# Patient Record
Sex: MALE | Race: WHITE | Employment: FULL TIME | URBAN - METROPOLITAN AREA
[De-identification: names, ages, dates, MRNs, and addresses within clinical notes are randomized per-mention and may not be internally consistent; named-entity substitution may affect disease eponyms.]

---

## 2018-07-19 ENCOUNTER — OFFICE VISIT (OUTPATIENT)
Dept: FAMILY MEDICINE CLINIC | Age: 53
End: 2018-07-19

## 2018-07-19 VITALS
SYSTOLIC BLOOD PRESSURE: 164 MMHG | HEIGHT: 76 IN | BODY MASS INDEX: 30.93 KG/M2 | OXYGEN SATURATION: 96 % | RESPIRATION RATE: 16 BRPM | DIASTOLIC BLOOD PRESSURE: 100 MMHG | WEIGHT: 254 LBS | HEART RATE: 66 BPM

## 2018-07-19 DIAGNOSIS — F42.9 OBSESSIVE-COMPULSIVE DISORDER, UNSPECIFIED TYPE: ICD-10-CM

## 2018-07-19 DIAGNOSIS — Z00.00 ANNUAL PHYSICAL EXAM: Primary | ICD-10-CM

## 2018-07-19 DIAGNOSIS — L29.9 ITCHY SCALP: ICD-10-CM

## 2018-07-19 DIAGNOSIS — F41.9 ANXIETY: Primary | ICD-10-CM

## 2018-07-19 PROCEDURE — 99203 OFFICE O/P NEW LOW 30 MIN: CPT | Performed by: FAMILY MEDICINE

## 2018-07-19 ASSESSMENT — ENCOUNTER SYMPTOMS: ROS SKIN COMMENTS: ITCHY SCALP

## 2018-07-19 ASSESSMENT — PATIENT HEALTH QUESTIONNAIRE - PHQ9
1. LITTLE INTEREST OR PLEASURE IN DOING THINGS: 1
2. FEELING DOWN, DEPRESSED OR HOPELESS: 1
SUM OF ALL RESPONSES TO PHQ QUESTIONS 1-9: 2
SUM OF ALL RESPONSES TO PHQ9 QUESTIONS 1 & 2: 2

## 2018-07-19 NOTE — PROGRESS NOTES
2018    This is a 46 y.o. male   Chief Complaint   Patient presents with    Established New Doctor     Anxiety possible OCD habits starting Sister has noticed since brother came to live with her.  Hair/Scalp Problem     gets itchy and has sores    . HPI  Pt presents today as a new pt to establish a PCP. Patient has a history of anxiety with possible obsessive-compulsive disorder. Patient has recently moved down from [facility] where he was living with his brother but his brother is now . Patient is living with his sister who accompanies him to the appointment today. States today that his anxiety has been worse since his brother's death, but his sister states that there have been issues with anxiety for several years. She also states that it is difficult to be in crowds. Also admits to obsessive thoughts and compulsive behavior. Patient also admits to recent itchy scalp. Has successfully used an over-the-counter shampoo for this condition but cannot recall the name of the shampoo. Past Medical History:   Diagnosis Date    Anxiety     GERD (gastroesophageal reflux disease)        Past Surgical History:   Procedure Laterality Date    MOUTH SURGERY         Social History     Social History    Marital status: Single     Spouse name: N/A    Number of children: N/A    Years of education: N/A     Occupational History    Not on file. Social History Main Topics    Smoking status: Never Smoker    Smokeless tobacco: Never Used    Alcohol use 3.6 oz/week     6 Cans of beer per week      Comment: Weekly    Drug use: Yes     Types: Marijuana      Comment: in past does not smoke     Sexual activity: No     Other Topics Concern    Not on file     Social History Narrative    No narrative on file       Family History   Problem Relation Age of Onset    Other Mother     Other Father        No current outpatient prescriptions on file.      No current facility-administered medications

## 2018-08-02 DIAGNOSIS — Z00.00 ANNUAL PHYSICAL EXAM: ICD-10-CM

## 2018-08-02 LAB
A/G RATIO: 1.6 (ref 1.1–2.2)
ALBUMIN SERPL-MCNC: 4.6 G/DL (ref 3.4–5)
ALP BLD-CCNC: 123 U/L (ref 40–129)
ALT SERPL-CCNC: 17 U/L (ref 10–40)
ANION GAP SERPL CALCULATED.3IONS-SCNC: 13 MMOL/L (ref 3–16)
AST SERPL-CCNC: 18 U/L (ref 15–37)
BASOPHILS ABSOLUTE: 0.1 K/UL (ref 0–0.2)
BASOPHILS RELATIVE PERCENT: 0.9 %
BILIRUB SERPL-MCNC: 0.9 MG/DL (ref 0–1)
BUN BLDV-MCNC: 18 MG/DL (ref 7–20)
CALCIUM SERPL-MCNC: 10.3 MG/DL (ref 8.3–10.6)
CHLORIDE BLD-SCNC: 103 MMOL/L (ref 99–110)
CHOLESTEROL, TOTAL: 186 MG/DL (ref 0–199)
CO2: 24 MMOL/L (ref 21–32)
CREAT SERPL-MCNC: 1.2 MG/DL (ref 0.9–1.3)
EOSINOPHILS ABSOLUTE: 0.1 K/UL (ref 0–0.6)
EOSINOPHILS RELATIVE PERCENT: 2 %
GFR AFRICAN AMERICAN: >60
GFR NON-AFRICAN AMERICAN: >60
GLOBULIN: 2.8 G/DL
GLUCOSE BLD-MCNC: 92 MG/DL (ref 70–99)
HCT VFR BLD CALC: 42.7 % (ref 40.5–52.5)
HDLC SERPL-MCNC: 64 MG/DL (ref 40–60)
HEMOGLOBIN: 14.3 G/DL (ref 13.5–17.5)
LDL CHOLESTEROL CALCULATED: 103 MG/DL
LYMPHOCYTES ABSOLUTE: 1.5 K/UL (ref 1–5.1)
LYMPHOCYTES RELATIVE PERCENT: 23.9 %
MAGNESIUM: 2.3 MG/DL (ref 1.8–2.4)
MCH RBC QN AUTO: 31.1 PG (ref 26–34)
MCHC RBC AUTO-ENTMCNC: 33.5 G/DL (ref 31–36)
MCV RBC AUTO: 92.9 FL (ref 80–100)
MONOCYTES ABSOLUTE: 0.6 K/UL (ref 0–1.3)
MONOCYTES RELATIVE PERCENT: 9.2 %
NEUTROPHILS ABSOLUTE: 4 K/UL (ref 1.7–7.7)
NEUTROPHILS RELATIVE PERCENT: 64 %
PDW BLD-RTO: 14.2 % (ref 12.4–15.4)
PLATELET # BLD: 183 K/UL (ref 135–450)
PMV BLD AUTO: 9.9 FL (ref 5–10.5)
POTASSIUM SERPL-SCNC: 4.2 MMOL/L (ref 3.5–5.1)
PROSTATE SPECIFIC ANTIGEN: 0.79 NG/ML (ref 0–4)
RBC # BLD: 4.6 M/UL (ref 4.2–5.9)
SODIUM BLD-SCNC: 140 MMOL/L (ref 136–145)
T3 FREE: 3.6 PG/ML (ref 2.3–4.2)
T4 FREE: 1.3 NG/DL (ref 0.9–1.8)
TOTAL PROTEIN: 7.4 G/DL (ref 6.4–8.2)
TRIGL SERPL-MCNC: 97 MG/DL (ref 0–150)
TSH SERPL DL<=0.05 MIU/L-ACNC: 2.23 UIU/ML (ref 0.27–4.2)
VITAMIN D 25-HYDROXY: 27.5 NG/ML
VLDLC SERPL CALC-MCNC: 19 MG/DL
WBC # BLD: 6.3 K/UL (ref 4–11)

## 2018-08-25 DIAGNOSIS — E55.9 VITAMIN D DEFICIENCY: Primary | ICD-10-CM

## 2018-08-25 RX ORDER — ERGOCALCIFEROL (VITAMIN D2) 1250 MCG
50000 CAPSULE ORAL WEEKLY
Qty: 4 CAPSULE | Refills: 1 | Status: CANCELLED | OUTPATIENT
Start: 2018-08-25 | End: 2018-10-14

## 2018-08-31 ENCOUNTER — TELEPHONE (OUTPATIENT)
Dept: FAMILY MEDICINE CLINIC | Age: 53
End: 2018-08-31

## 2018-08-31 NOTE — TELEPHONE ENCOUNTER
Notes recorded by Eduardo Mcdaniel MA on 8/31/2018 at 10:50 AM EDT  Patient advised will call us back with a pharmacy that he uses with address and phone/fax numbers          Pt called back and will be having his sister, Anibal Olivarez, call us back because she works at [employer] where he wants the vitamin D sent.

## 2018-09-06 ENCOUNTER — TELEPHONE (OUTPATIENT)
Dept: FAMILY MEDICINE CLINIC | Age: 53
End: 2018-09-06

## 2018-09-06 DIAGNOSIS — E55.9 VITAMIN D DEFICIENCY: Primary | ICD-10-CM

## 2018-09-06 RX ORDER — ERGOCALCIFEROL (VITAMIN D2) 1250 MCG
50000 CAPSULE ORAL WEEKLY
Qty: 4 CAPSULE | Refills: 1 | Status: SHIPPED | OUTPATIENT
Start: 2018-09-06 | End: 2022-03-10

## 2018-09-06 NOTE — TELEPHONE ENCOUNTER
Patients sister is wanting to know if dr. Ronan Lanier was sending in vitamin D medication because as of 9/6/2018 nothing was called in

## 2018-10-02 ENCOUNTER — TELEPHONE (OUTPATIENT)
Dept: FAMILY MEDICINE CLINIC | Age: 53
End: 2018-10-02

## 2018-10-18 ENCOUNTER — OFFICE VISIT (OUTPATIENT)
Dept: FAMILY MEDICINE CLINIC | Age: 53
End: 2018-10-18

## 2018-10-18 VITALS
SYSTOLIC BLOOD PRESSURE: 158 MMHG | WEIGHT: 268 LBS | BODY MASS INDEX: 32.63 KG/M2 | DIASTOLIC BLOOD PRESSURE: 94 MMHG | HEART RATE: 62 BPM | RESPIRATION RATE: 16 BRPM | HEIGHT: 76 IN | OXYGEN SATURATION: 96 %

## 2018-10-18 DIAGNOSIS — R03.0 ELEVATED BLOOD PRESSURE READING: Primary | ICD-10-CM

## 2018-10-18 DIAGNOSIS — L29.9 ITCHY SCALP: ICD-10-CM

## 2018-10-18 PROCEDURE — 99213 OFFICE O/P EST LOW 20 MIN: CPT | Performed by: FAMILY MEDICINE

## 2018-10-18 RX ORDER — BLOOD PRESSURE TEST KIT
1 KIT MISCELLANEOUS 2 TIMES DAILY
Qty: 1 KIT | Refills: 0 | Status: SHIPPED | OUTPATIENT
Start: 2018-10-18 | End: 2022-03-10

## 2018-10-18 NOTE — PROGRESS NOTES
 T4 Free 08/02/2018 1.3  0.9 - 1.8 ng/dL Final    T3, Free 08/02/2018 3.6  2.3 - 4.2 pg/mL Final    Cholesterol, Total 08/02/2018 186  0 - 199 mg/dL Final    Triglycerides 08/02/2018 97  0 - 150 mg/dL Final    HDL 08/02/2018 64* 40 - 60 mg/dL Final    LDL Calculated 08/02/2018 103* <100 mg/dL Final    VLDL Cholesterol Calculated 08/02/2018 19  Not Established mg/dL Final    WBC 08/02/2018 6.3  4.0 - 11.0 K/uL Final    RBC 08/02/2018 4.60  4.20 - 5.90 M/uL Final    Hemoglobin 08/02/2018 14.3  13.5 - 17.5 g/dL Final    Hematocrit 08/02/2018 42.7  40.5 - 52.5 % Final    MCV 08/02/2018 92.9  80.0 - 100.0 fL Final    MCH 08/02/2018 31.1  26.0 - 34.0 pg Final    MCHC 08/02/2018 33.5  31.0 - 36.0 g/dL Final    RDW 08/02/2018 14.2  12.4 - 15.4 % Final    Platelets 97/75/6244 183  135 - 450 K/uL Final    MPV 08/02/2018 9.9  5.0 - 10.5 fL Final    Neutrophils % 08/02/2018 64.0  % Final    Lymphocytes % 08/02/2018 23.9  % Final    Monocytes % 08/02/2018 9.2  % Final    Eosinophils % 08/02/2018 2.0  % Final    Basophils % 08/02/2018 0.9  % Final    Neutrophils # 08/02/2018 4.0  1.7 - 7.7 K/uL Final    Lymphocytes # 08/02/2018 1.5  1.0 - 5.1 K/uL Final    Monocytes # 08/02/2018 0.6  0.0 - 1.3 K/uL Final    Eosinophils # 08/02/2018 0.1  0.0 - 0.6 K/uL Final    Basophils # 08/02/2018 0.1  0.0 - 0.2 K/uL Final    Sodium 08/02/2018 140  136 - 145 mmol/L Final    Potassium 08/02/2018 4.2  3.5 - 5.1 mmol/L Final    Chloride 08/02/2018 103  99 - 110 mmol/L Final    CO2 08/02/2018 24  21 - 32 mmol/L Final    Anion Gap 08/02/2018 13  3 - 16 Final    Glucose 08/02/2018 92  70 - 99 mg/dL Final    BUN 08/02/2018 18  7 - 20 mg/dL Final    CREATININE 08/02/2018 1.2  0.9 - 1.3 mg/dL Final    GFR Non- 08/02/2018 >60  >60 Final    Comment: >60 mL/min/1.73m2 EGFR, calc. for ages 25 and older using the  MDRD formula (not corrected for weight), is valid for stable  renal function.       GFR

## 2022-03-10 ENCOUNTER — HOSPITAL ENCOUNTER (EMERGENCY)
Age: 57
Discharge: HOME | End: 2022-03-10

## 2022-03-10 VITALS
TEMPERATURE: 99.1 F | DIASTOLIC BLOOD PRESSURE: 86 MMHG | WEIGHT: 230 LBS | BODY MASS INDEX: 28.01 KG/M2 | RESPIRATION RATE: 16 BRPM | OXYGEN SATURATION: 97 % | HEART RATE: 69 BPM | HEIGHT: 76 IN | SYSTOLIC BLOOD PRESSURE: 149 MMHG

## 2022-03-10 DIAGNOSIS — K04.7 DENTAL ABSCESS: ICD-10-CM

## 2022-03-10 DIAGNOSIS — K08.89 PAIN, DENTAL: Primary | ICD-10-CM

## 2022-03-10 DIAGNOSIS — R22.0 SWELLING ASSOCIATED WITH DENTAL STRUCTURE: ICD-10-CM

## 2022-03-10 PROCEDURE — 6370000000 HC RX 637 (ALT 250 FOR IP): Performed by: PHYSICIAN ASSISTANT

## 2022-03-10 PROCEDURE — 99284 EMERGENCY DEPT VISIT MOD MDM: CPT

## 2022-03-10 RX ORDER — CLINDAMYCIN HYDROCHLORIDE 150 MG/1
450 CAPSULE ORAL ONCE
Status: COMPLETED | OUTPATIENT
Start: 2022-03-10 | End: 2022-03-10

## 2022-03-10 RX ORDER — CLINDAMYCIN HYDROCHLORIDE 300 MG/1
300 CAPSULE ORAL 3 TIMES DAILY
Qty: 21 CAPSULE | Refills: 0 | Status: SHIPPED | OUTPATIENT
Start: 2022-03-10 | End: 2022-03-17

## 2022-03-10 RX ADMIN — CLINDAMYCIN HYDROCHLORIDE 450 MG: 150 CAPSULE ORAL at 11:12

## 2022-03-10 NOTE — ED PROVIDER NOTES
and lower normal.  · Mouth:  normal tongue and buccal mucosa. · Dental:  Swelling to left lower jaw, tooth 17, cavity with associated erythema and swelling to surrounding gumline. No elevation of the floor of the mouth, airway is patent. Trismus: No.         Drooling: No.           Airway stridor: No.  · Facial skin: bilateral no wounds, erythema, or swelling. · Respiratory:  Clear to auscultation and breath sounds equal.    · CV: Regular rate and rhythm, normal heart sounds, without pathological murmurs, ectopy, gallops, or rubs. · Skin:  No rashes, erythema or lesions present, unless noted elsewhere. .  · Lymphatics: No lymphangitis or adenopathy noted. · Neurological:  Oriented. Motor functions intact. Lab / Imaging Results   (All laboratory and radiology results have been personally reviewed by myself)  Labs:  No results found for this visit on 03/10/22. Imaging: All Radiology results interpreted by Radiologist unless otherwise noted. No orders to display     ED Course / Medical Decision Making     Medications   clindamycin (CLEOCIN) capsule 450 mg (has no administration in time range)        Plan of Care/Counseling:  Afebrile, non toxic, NAD. SpO2 on room air of 97% not hypoxic. Physician Assistant on duty reviewed today's visit with the patient in addition to providing specific details for the plan of care and counseling regarding the diagnosis and prognosis. Questions are answered at this time and are agreeable with the plan. I estimate there is LOW risk for a ANAPHYLAXIS, DEEP SPACE INFECTION (e.g., ABIMAELS ANGINA OR RETROPHARYNGEAL ABSCESS), EPIGLOTTITIS, MENINGITIS, or AIRWAY COMPROMISE, thus I consider the discharge disposition reasonable. Also, there is no evidence or peritonitis, sepsis, or toxicity. Isaak Davis and I have discussed the diagnosis and risks, and we agree with discharging home to follow-up with their primary doctor.  We also discussed returning to the Emergency Department immediately if new or worsening symptoms occur. We have discussed the symptoms which are most concerning (e.g., changing or worsening pain, trouble swallowing or breathing, neck stiffness or fever) that necessitate immediate return. Final Impression    1. Pain, dental    2. Swelling associated with dental structure    3. Dental abscess        Discharge Vital Signs:  Blood pressure (!) 149/86, pulse 69, temperature 99.1 °F (37.3 °C), temperature source Oral, resp. rate 16, height 6' 4\" (1.93 m), weight 230 lb (104.3 kg), SpO2 97 %. New Medications     New Prescriptions    CLINDAMYCIN (CLEOCIN) 300 MG CAPSULE    Take 1 capsule by mouth 3 times daily for 7 days     Electronically signed by Sunshine Boland PA-C   DD: 3/10/22  **This report was transcribed using voice recognition software. Every effort was made to ensure accuracy; however, inadvertent computerized transcription errors may be present.   END OF ED PROVIDER NOTE       Sunshine Boland PA-C  03/10/22 1100

## 2022-03-10 NOTE — ED TRIAGE NOTES
Patient presents with left sided tooth pain with swelling that started 2 days ago. Has dental appt at the end of march. Pain has decreased today but swelling has increased.

## 2022-03-16 ENCOUNTER — TELEMEDICINE (OUTPATIENT)
Dept: FAMILY MEDICINE CLINIC | Age: 57
End: 2022-03-16

## 2022-03-16 DIAGNOSIS — K04.7 DENTAL INFECTION: Primary | ICD-10-CM

## 2022-03-16 DIAGNOSIS — F42.9 OBSESSIVE-COMPULSIVE DISORDER, UNSPECIFIED TYPE: ICD-10-CM

## 2022-03-16 DIAGNOSIS — F41.9 ANXIETY: ICD-10-CM

## 2022-03-16 PROCEDURE — 99203 OFFICE O/P NEW LOW 30 MIN: CPT | Performed by: FAMILY MEDICINE

## 2022-03-16 RX ORDER — CLINDAMYCIN HYDROCHLORIDE 300 MG/1
300 CAPSULE ORAL 3 TIMES DAILY
Qty: 21 CAPSULE | Refills: 0 | Status: CANCELLED | OUTPATIENT
Start: 2022-03-16 | End: 2022-03-23

## 2022-03-16 SDOH — ECONOMIC STABILITY: TRANSPORTATION INSECURITY
IN THE PAST 12 MONTHS, HAS LACK OF TRANSPORTATION KEPT YOU FROM MEETINGS, WORK, OR FROM GETTING THINGS NEEDED FOR DAILY LIVING?: NO

## 2022-03-16 SDOH — ECONOMIC STABILITY: INCOME INSECURITY: IN THE LAST 12 MONTHS, WAS THERE A TIME WHEN YOU WERE NOT ABLE TO PAY THE MORTGAGE OR RENT ON TIME?: NO

## 2022-03-16 SDOH — ECONOMIC STABILITY: HOUSING INSECURITY
IN THE LAST 12 MONTHS, WAS THERE A TIME WHEN YOU DID NOT HAVE A STEADY PLACE TO SLEEP OR SLEPT IN A SHELTER (INCLUDING NOW)?: NO

## 2022-03-16 SDOH — ECONOMIC STABILITY: FOOD INSECURITY: WITHIN THE PAST 12 MONTHS, THE FOOD YOU BOUGHT JUST DIDN'T LAST AND YOU DIDN'T HAVE MONEY TO GET MORE.: NEVER TRUE

## 2022-03-16 SDOH — ECONOMIC STABILITY: FOOD INSECURITY: WITHIN THE PAST 12 MONTHS, YOU WORRIED THAT YOUR FOOD WOULD RUN OUT BEFORE YOU GOT MONEY TO BUY MORE.: NEVER TRUE

## 2022-03-16 SDOH — ECONOMIC STABILITY: TRANSPORTATION INSECURITY
IN THE PAST 12 MONTHS, HAS THE LACK OF TRANSPORTATION KEPT YOU FROM MEDICAL APPOINTMENTS OR FROM GETTING MEDICATIONS?: NO

## 2022-03-16 ASSESSMENT — SOCIAL DETERMINANTS OF HEALTH (SDOH): HOW HARD IS IT FOR YOU TO PAY FOR THE VERY BASICS LIKE FOOD, HOUSING, MEDICAL CARE, AND HEATING?: NOT HARD AT ALL

## 2022-03-16 ASSESSMENT — PATIENT HEALTH QUESTIONNAIRE - PHQ9
SUM OF ALL RESPONSES TO PHQ QUESTIONS 1-9: 0
2. FEELING DOWN, DEPRESSED OR HOPELESS: 0
SUM OF ALL RESPONSES TO PHQ QUESTIONS 1-9: 0
SUM OF ALL RESPONSES TO PHQ QUESTIONS 1-9: 0
1. LITTLE INTEREST OR PLEASURE IN DOING THINGS: 0
SUM OF ALL RESPONSES TO PHQ QUESTIONS 1-9: 0
SUM OF ALL RESPONSES TO PHQ9 QUESTIONS 1 & 2: 0
DEPRESSION UNABLE TO ASSESS: FUNCTIONAL CAPACITY MOTIVATION LIMITS ACCURACY

## 2022-03-16 NOTE — PROGRESS NOTES
3/16/2022    TELEHEALTH EVALUATION -- Audio/Visual (During OZXFX-65 public health emergency)    HPI:    Rodger Lopez (:  1965) has requested an audio/video evaluation for the following concern(s):    Pt presents today via doxy. me Video visit for lower left tooth pain for the last 2 weeks. Taking Cleocin 300 mg 3 times daily x7 days. [facility] emergency department on March 10, 2022. Blood pressure was elevated at 149/86 in the emergency department. Patient had not been seen by myself since 2018. Present today on the visit with his sister, pt states that Cleocin has helped, edema has decreased. [facility] dental appointment on  [facility] [place]. Denies chills, feels a little warm. States that his BP is usually \"indy high\", drinks coffee. Has been taking Ibuprofen. Review of Systems   Constitutional: Positive for fever. Negative for chills. HENT:        Positive for dental pain and facial edema that has improved. Prior to Visit Medications    Medication Sig Taking? Authorizing Provider   Magic Mouthwash (MIRACLE MOUTHWASH) Swish and spit 5 mLs 4 times daily as needed for Irritation Yes Shirley Vergara,    clindamycin (CLEOCIN) 300 MG capsule Take 1 capsule by mouth 3 times daily for 7 days Yes Leah Medina PA-C       Social History     Tobacco Use    Smoking status: Current Every Day Smoker     Packs/day: 1.00     Types: Cigarettes    Smokeless tobacco: Never Used   Vaping Use    Vaping Use: Never used   Substance Use Topics    Alcohol use:  Yes     Alcohol/week: 6.0 standard drinks     Types: 6 Cans of beer per week     Comment: Weekly    Drug use: Yes     Types: Marijuana (Weed)     Comment: in past does not smoke         Allergies   Allergen Reactions    Penicillins Rash   ,   Past Medical History:   Diagnosis Date    Anxiety     GERD (gastroesophageal reflux disease)    ,   Past Surgical History:   Procedure Laterality Date    MOUTH SURGERY  2001       PHYSICAL EXAMINATION:  [ INSTRUCTIONS:  \"[x]\" Indicates a positive item  \"[]\" Indicates a negative item  -- DELETE ALL ITEMS NOT EXAMINED]  Vital Signs: (As obtained by patient/caregiver or practitioner observation)    Height  - 6' 4\"            Weight -  235 lb            Blood pressure-        Heart rate-     Temperature-      Constitutional: [x] Appears well-developed and well-nourished [x] No apparent distress      [] Abnormal-   Mental status  [x] Alert and awake  [x] Oriented to person/place/time [x]Able to follow commands      Eyes:  EOM    [x]  Normal  [] Abnormal-  Sclera  []  Normal  [] Abnormal -         Discharge []  None visible  [] Abnormal -    HENT:   [x] Normocephalic, atraumatic. [x] Abnormal - mild left jaw line edema     Mouth/Throat: Mucous membranes are moist.     External Ears [x] Normal  [] Abnormal-     Neck: [x] No visualized mass     Pulmonary/Chest: [x] Respiratory effort normal.  [x] No visualized signs of difficulty breathing or respiratory distress        [] Abnormal-      Musculoskeletal:   [] Normal gait with no signs of ataxia         [x] Normal range of motion of neck        [] Abnormal-       Neurological:        [x] No Facial Asymmetry (Cranial nerve 7 motor function) (limited exam to video visit)          [x] No gaze palsy        [] Abnormal-         Skin:        [x] No significant exanthematous lesions or discoloration noted on facial skin         [] Abnormal-            Psychiatric:       [x] Normal Affect [] No Hallucinations        [] Abnormal-     Other pertinent observable physical exam findings-     ASSESSMENT/PLAN:  1. Dental infection  - improved sx  - continue Cleocin prescribed on 03/10  - Magic Mouthwash (MIRACLE MOUTHWASH); Swish and spit 5 mLs 4 times daily as needed for Irritation  Dispense: 1 each; Refill: 1    2. Anxiety  - TSH; Future  - Lipid Panel;  Future  - CBC with Auto Differential; Future  - Comprehensive Metabolic Panel; Future  - PSA Screening; Future    3. Obsessive-compulsive disorder, unspecified type  - TSH; Future  - Lipid Panel; Future  - CBC with Auto Differential; Future  - Comprehensive Metabolic Panel; Future  - PSA Screening; Future    Pt's sister will call office Friday with a status. BP BID x 2 weeks and call office with readings. Return in about 2 months (around 5/16/2022) for Physical Exam.    Yu Samaniego, was evaluated through a synchronous (real-time) audio-video encounter. The patient (or guardian if applicable) is aware that this is a billable service. Verbal consent to proceed has been obtained within the past 12 months. The visit was conducted pursuant to the emergency declaration under the [address] [place] [address], [address] [place] [address] authority and the [employer] General Act. Patient identification was verified, and a caregiver was present when appropriate. The patient was located in a state where the provider was credentialed to provide care. Total time spent on this encounter: Not billed by time    --Sotero Mascorro DO on 3/16/2022 at 1:59 PM    An electronic signature was used to authenticate this note.

## 2022-05-29 ENCOUNTER — TELEPHONE (OUTPATIENT)
Dept: FAMILY MEDICINE CLINIC | Age: 57
End: 2022-05-29

## 2022-05-29 NOTE — TELEPHONE ENCOUNTER
Pt's sibling is looking into Guardianship and SSI for pt. Can you please provide any information about starting the process for both. I do know that we would supply all medical documentation including progress notes, labs, imaging, etc. Thank you.

## 2022-06-19 RX ORDER — SELENIUM SULFIDE 22.5 MG/ML
5 SHAMPOO TOPICAL
Qty: 1 EACH | Refills: 0 | Status: SHIPPED | OUTPATIENT
Start: 2022-06-19

## 2022-12-01 ENCOUNTER — OFFICE VISIT (OUTPATIENT)
Dept: FAMILY MEDICINE CLINIC | Age: 57
End: 2022-12-01
Payer: MEDICAID

## 2022-12-01 VITALS
OXYGEN SATURATION: 99 % | WEIGHT: 232.4 LBS | BODY MASS INDEX: 28.29 KG/M2 | RESPIRATION RATE: 16 BRPM | SYSTOLIC BLOOD PRESSURE: 148 MMHG | DIASTOLIC BLOOD PRESSURE: 84 MMHG | HEART RATE: 65 BPM

## 2022-12-01 DIAGNOSIS — L30.9 DERMATITIS: ICD-10-CM

## 2022-12-01 DIAGNOSIS — I10 ELEVATED BLOOD PRESSURE READING IN OFFICE WITH DIAGNOSIS OF HYPERTENSION: Primary | ICD-10-CM

## 2022-12-01 PROCEDURE — 99213 OFFICE O/P EST LOW 20 MIN: CPT | Performed by: FAMILY MEDICINE

## 2022-12-01 PROCEDURE — 3079F DIAST BP 80-89 MM HG: CPT | Performed by: FAMILY MEDICINE

## 2022-12-01 PROCEDURE — 3077F SYST BP >= 140 MM HG: CPT | Performed by: FAMILY MEDICINE

## 2022-12-01 RX ORDER — TRIAMCINOLONE ACETONIDE 5 MG/G
CREAM TOPICAL
Qty: 1 EACH | Refills: 0 | Status: SHIPPED | OUTPATIENT
Start: 2022-12-01 | End: 2022-12-08

## 2022-12-01 ASSESSMENT — PATIENT HEALTH QUESTIONNAIRE - PHQ9
SUM OF ALL RESPONSES TO PHQ QUESTIONS 1-9: 0
SUM OF ALL RESPONSES TO PHQ QUESTIONS 1-9: 0
2. FEELING DOWN, DEPRESSED OR HOPELESS: 0
SUM OF ALL RESPONSES TO PHQ QUESTIONS 1-9: 0
SUM OF ALL RESPONSES TO PHQ9 QUESTIONS 1 & 2: 0
SUM OF ALL RESPONSES TO PHQ QUESTIONS 1-9: 0
1. LITTLE INTEREST OR PLEASURE IN DOING THINGS: 0

## 2022-12-01 NOTE — PROGRESS NOTES
12/1/2022    This is a 61 y.o. male   Chief Complaint   Patient presents with    Rash     Rash on head    . HPI  Pt presents for a head rash follow-up. Has been using selenium sulfide 2.25% shampoo. Rash:  pt states he has had for 18 years, has also tried T-Gell. Neither treatment completely resolved, is itchy. BP elevated today 148/84  Past Medical History:   Diagnosis Date    Anxiety     GERD (gastroesophageal reflux disease)        Past Surgical History:   Procedure Laterality Date    MOUTH SURGERY  2001       Social History     Socioeconomic History    Marital status: Single     Spouse name: Not on file    Number of children: Not on file    Years of education: Not on file    Highest education level: Not on file   Occupational History    Not on file   Tobacco Use    Smoking status: Every Day     Packs/day: 1.00     Years: 30.00     Pack years: 30.00     Types: Cigarettes     Start date: 11    Smokeless tobacco: Never   Vaping Use    Vaping Use: Never used   Substance and Sexual Activity    Alcohol use: Yes     Alcohol/week: 6.0 standard drinks     Types: 6 Cans of beer per week     Comment: Weekly    Drug use: Yes     Types: Marijuana (Weed)     Comment: in past does not smoke     Sexual activity: Never   Other Topics Concern    Not on file   Social History Narrative    Not on file     Social Determinants of Health     Financial Resource Strain: Low Risk     Difficulty of Paying Living Expenses: Not hard at all   Food Insecurity: No Food Insecurity    Worried About [address] in the Last Year: Never true    [address] in the Last Year: Never true   Transportation Needs: No Transportation Needs    Lack of Transportation (Medical): No    Lack of Transportation (Non-Medical):  No   Physical Activity: Not on file   Stress: Not on file   Social Connections: Not on file   Intimate Partner Violence: Not on file   Housing Stability: Unknown    Unable to Pay for Housing in the Last Year: No Number of Places Lived in the Last Year: Not on file    Unstable Housing in the Last Year: No       Family History   Problem Relation Age of Onset    Other Mother     Other Father        Current Outpatient Medications   Medication Sig Dispense Refill    triamcinolone (ARISTOCORT) 0.5 % cream Apply topically 3 times daily. 1 each 0    Selenium Sulfide 2.25 % SHAM Apply 5 mLs topically every 3 days . Leave in for 2 to 3 minutes before rinsing. Use for 2 weeks. 1 each 0    Magic Mouthwash (MIRACLE MOUTHWASH) Swish and spit 5 mLs 4 times daily as needed for Irritation 1 each 1     No current facility-administered medications for this visit. There is no immunization history on file for this patient.     Allergies   Allergen Reactions    Penicillins Rash       Orders Only on 08/02/2018   Component Date Value Ref Range Status    TSH 08/02/2018 2.23  0.27 - 4.20 uIU/mL Final    T4 Free 08/02/2018 1.3  0.9 - 1.8 ng/dL Final    T3, Free 08/02/2018 3.6  2.3 - 4.2 pg/mL Final    Cholesterol, Total 08/02/2018 186  0 - 199 mg/dL Final    Triglycerides 08/02/2018 97  0 - 150 mg/dL Final    HDL 08/02/2018 64 (A)  40 - 60 mg/dL Final    LDL Calculated 08/02/2018 103 (A)  <100 mg/dL Final    VLDL Cholesterol Calculated 08/02/2018 19  Not Established mg/dL Final    WBC 08/02/2018 6.3  4.0 - 11.0 K/uL Final    RBC 08/02/2018 4.60  4.20 - 5.90 M/uL Final    Hemoglobin 08/02/2018 14.3  13.5 - 17.5 g/dL Final    Hematocrit 08/02/2018 42.7  40.5 - 52.5 % Final    MCV 08/02/2018 92.9  80.0 - 100.0 fL Final    MCH 08/02/2018 31.1  26.0 - 34.0 pg Final    MCHC 08/02/2018 33.5  31.0 - 36.0 g/dL Final    RDW 08/02/2018 14.2  12.4 - 15.4 % Final    Platelets 67/28/2748 183  135 - 450 K/uL Final    MPV 08/02/2018 9.9  5.0 - 10.5 fL Final    Neutrophils % 08/02/2018 64.0  % Final    Lymphocytes % 08/02/2018 23.9  % Final    Monocytes % 08/02/2018 9.2  % Final    Eosinophils % 08/02/2018 2.0  % Final    Basophils % 08/02/2018 0.9  % Final Neutrophils Absolute 08/02/2018 4.0  1.7 - 7.7 K/uL Final    Lymphocytes Absolute 08/02/2018 1.5  1.0 - 5.1 K/uL Final    Monocytes Absolute 08/02/2018 0.6  0.0 - 1.3 K/uL Final    Eosinophils Absolute 08/02/2018 0.1  0.0 - 0.6 K/uL Final    Basophils Absolute 08/02/2018 0.1  0.0 - 0.2 K/uL Final    Sodium 08/02/2018 140  136 - 145 mmol/L Final    Potassium 08/02/2018 4.2  3.5 - 5.1 mmol/L Final    Chloride 08/02/2018 103  99 - 110 mmol/L Final    CO2 08/02/2018 24  21 - 32 mmol/L Final    Anion Gap 08/02/2018 13  3 - 16 Final    Glucose 08/02/2018 92  70 - 99 mg/dL Final    BUN 08/02/2018 18  7 - 20 mg/dL Final    Creatinine 08/02/2018 1.2  0.9 - 1.3 mg/dL Final    GFR Non- 08/02/2018 >60  >60 Final    Comment: >60 mL/min/1.73m2 EGFR, calc. for ages 25 and older using the  MDRD formula (not corrected for weight), is valid for stable  renal function. GFR  08/02/2018 >60  >60 Final    Comment: Chronic Kidney Disease: less than 60 ml/min/1.73 sq.m. Kidney Failure: less than 15 ml/min/1.73 sq.m. Results valid for patients 18 years and older. Calcium 08/02/2018 10.3  8.3 - 10.6 mg/dL Final    Total Protein 08/02/2018 7.4  6.4 - 8.2 g/dL Final    Albumin 08/02/2018 4.6  3.4 - 5.0 g/dL Final    Albumin/Globulin Ratio 08/02/2018 1.6  1.1 - 2.2 Final    Total Bilirubin 08/02/2018 0.9  0.0 - 1.0 mg/dL Final    Alkaline Phosphatase 08/02/2018 123  40 - 129 U/L Final    ALT 08/02/2018 17  10 - 40 U/L Final    AST 08/02/2018 18  15 - 37 U/L Final    Globulin 08/02/2018 2.8  g/dL Final    Vit D, 25-Hydroxy 08/02/2018 27.5 (A)  >=30 ng/mL Final    Comment: <=20 ng/mL. ........... Dawna Ruffing Deficient  21-29 ng/mL. ......... Dawna Ruffing Insufficient  >=30 ng/mL. ........ Dawna Ruffing Sufficient      Magnesium 08/02/2018 2.30  1.80 - 2.40 mg/dL Final    PSA 08/02/2018 0.79  0.00 - 4.00 ng/mL Final       Review of Systems   Skin:  Positive for rash.      BP (!) 148/84 (Site: Left Upper Arm, Position: Sitting, Cuff Size: Large Adult)   Pulse 65   Resp 16   Wt 232 lb 6.4 oz (105.4 kg)   SpO2 99%   BMI 28.29 kg/m²     Physical Exam  Vitals reviewed. Skin:     Comments: Scattered raised erythremic/scabbed areas over entire scalp, no discharge       Plan   Diagnosis Orders   1. Elevated blood pressure reading in office with diagnosis of hypertension  BP BID x 1 week and send results via [employer]. 2. Dermatitis  Jhon Robledo MD, Dermatology, [facility]    triamcinolone (ARISTOCORT) 0.5 % cream          Return in about 2 months (around 2/1/2023) for Physical Exam.    Prior to Visit Medications    Medication Sig Taking? Authorizing Provider   triamcinolone (ARISTOCORT) 0.5 % cream Apply topically 3 times daily. Yes Hosston Agent, DO   Selenium Sulfide 2.25 % SHAM Apply 5 mLs topically every 3 days . Leave in for 2 to 3 minutes before rinsing. Use for 2 weeks.  Yes Hosston Agent, DO   Magic Mouthwash (MIRACLE MOUTHWASH) Swish and spit 5 mLs 4 times daily as needed for Irritation Yes Hosston Agent, DO

## 2022-12-13 ENCOUNTER — PATIENT MESSAGE (OUTPATIENT)
Dept: FAMILY MEDICINE CLINIC | Age: 57
End: 2022-12-13

## 2022-12-13 DIAGNOSIS — L30.9 DERMATITIS: ICD-10-CM

## 2022-12-13 RX ORDER — TRIAMCINOLONE ACETONIDE 5 MG/G
CREAM TOPICAL
Qty: 1 EACH | Refills: 2 | Status: SHIPPED | OUTPATIENT
Start: 2022-12-13 | End: 2022-12-20

## 2022-12-20 DIAGNOSIS — L30.9 DERMATITIS: ICD-10-CM

## 2022-12-21 RX ORDER — TRIAMCINOLONE ACETONIDE 5 MG/G
CREAM TOPICAL
Qty: 15 G | Refills: 1 | Status: SHIPPED | OUTPATIENT
Start: 2022-12-21

## 2022-12-21 NOTE — TELEPHONE ENCOUNTER
From: Micha Marroquin  Sent: 12/21/2022 8:44 AM EST  To: [facility]  Subject: Triamcinolone    Good Morning Dr Kai Pickett,    [facility] does not show a refill so they were going to send a message to your inbox.   Thank you,  Blanche Andrade

## 2022-12-21 NOTE — TELEPHONE ENCOUNTER
LOV 12/1/2022    Future Appointments   Date Time Provider Sandeep Mcclain   2/21/2023 11:15 AM DO NOAH Diaz [place] - [facility]   4/14/2023 10:45 AM MD Deepika Kitchen

## 2023-06-29 ENCOUNTER — OFFICE VISIT (OUTPATIENT)
Dept: DERMATOLOGY | Age: 58
End: 2023-06-29
Payer: MEDICAID

## 2023-06-29 DIAGNOSIS — L21.8 OTHER SEBORRHEIC DERMATITIS: Primary | ICD-10-CM

## 2023-06-29 DIAGNOSIS — L28.1 PRURIGO NODULARIS: ICD-10-CM

## 2023-06-29 PROCEDURE — 99204 OFFICE O/P NEW MOD 45 MIN: CPT | Performed by: INTERNAL MEDICINE

## 2023-06-29 RX ORDER — KETOCONAZOLE 20 MG/ML
SHAMPOO TOPICAL
Qty: 120 ML | Refills: 11 | Status: SHIPPED | OUTPATIENT
Start: 2023-06-29

## 2023-06-29 RX ORDER — CLOBETASOL PROPIONATE 0.46 MG/ML
SOLUTION TOPICAL
Qty: 50 ML | Refills: 2 | Status: SHIPPED | OUTPATIENT
Start: 2023-06-29